# Patient Record
Sex: MALE | Race: WHITE | Employment: UNEMPLOYED | URBAN - METROPOLITAN AREA
[De-identification: names, ages, dates, MRNs, and addresses within clinical notes are randomized per-mention and may not be internally consistent; named-entity substitution may affect disease eponyms.]

---

## 2023-02-08 RX ORDER — CYCLOBENZAPRINE HCL 10 MG
10 TABLET ORAL 3 TIMES DAILY PRN
COMMUNITY

## 2023-02-08 RX ORDER — DULOXETIN HYDROCHLORIDE 60 MG/1
60 CAPSULE, DELAYED RELEASE ORAL DAILY
COMMUNITY

## 2023-02-08 RX ORDER — GABAPENTIN 800 MG/1
800 TABLET ORAL 3 TIMES DAILY
COMMUNITY

## 2023-02-08 RX ORDER — HYDROCODONE BITARTRATE AND ACETAMINOPHEN 7.5; 325 MG/1; MG/1
1 TABLET ORAL EVERY 6 HOURS PRN
COMMUNITY

## 2023-02-08 RX ORDER — ATORVASTATIN CALCIUM 80 MG/1
80 TABLET, FILM COATED ORAL DAILY
COMMUNITY

## 2023-02-08 RX ORDER — LIDOCAINE AND PRILOCAINE 25; 25 MG/G; MG/G
CREAM TOPICAL PRN
COMMUNITY

## 2023-02-08 RX ORDER — BUPROPION HYDROCHLORIDE 300 MG/1
300 TABLET ORAL EVERY MORNING
COMMUNITY

## 2023-02-08 RX ORDER — METFORMIN HYDROCHLORIDE 500 MG/1
500 TABLET, FILM COATED, EXTENDED RELEASE ORAL
COMMUNITY

## 2023-02-08 RX ORDER — CETIRIZINE HYDROCHLORIDE 10 MG/1
10 TABLET ORAL DAILY
COMMUNITY

## 2023-02-08 RX ORDER — ALBUTEROL SULFATE 90 UG/1
2 AEROSOL, METERED RESPIRATORY (INHALATION) EVERY 6 HOURS PRN
COMMUNITY

## 2023-02-08 RX ORDER — SILDENAFIL 100 MG/1
100 TABLET, FILM COATED ORAL PRN
COMMUNITY

## 2023-02-08 RX ORDER — FLUTICASONE PROPIONATE 50 MCG
1 SPRAY, SUSPENSION (ML) NASAL DAILY
COMMUNITY

## 2023-02-21 ENCOUNTER — OFFICE VISIT (OUTPATIENT)
Dept: NEUROSURGERY | Age: 58
End: 2023-02-21
Payer: OTHER GOVERNMENT

## 2023-02-21 VITALS
DIASTOLIC BLOOD PRESSURE: 67 MMHG | HEART RATE: 89 BPM | HEIGHT: 66 IN | OXYGEN SATURATION: 96 % | TEMPERATURE: 97.7 F | BODY MASS INDEX: 37.45 KG/M2 | SYSTOLIC BLOOD PRESSURE: 113 MMHG | WEIGHT: 233 LBS

## 2023-02-21 DIAGNOSIS — T84.216A HARDWARE FAILURE OF ANTERIOR COLUMN OF SPINE (HCC): ICD-10-CM

## 2023-02-21 DIAGNOSIS — G95.20 CERVICAL CORD COMPRESSION WITH MYELOPATHY (HCC): Primary | ICD-10-CM

## 2023-02-21 DIAGNOSIS — M80.08XK: ICD-10-CM

## 2023-02-21 DIAGNOSIS — Z72.0 TOBACCO ABUSE: ICD-10-CM

## 2023-02-21 PROCEDURE — 99204 OFFICE O/P NEW MOD 45 MIN: CPT | Performed by: NEUROLOGICAL SURGERY

## 2023-02-21 NOTE — PROGRESS NOTES
History of Present Illness    Patient Words: 58 y.o.     This patient is a 58 y.o. male who presents today for evaluation of a several year history of progressive upper extremity numbness weakness and incoordination with fine motor control issues.  He is left-handed.  He has difficulty using the left hand to sign his name.  Previous cervical spine surgery 15 years ago.  History of tobacco abuse.  CT scanning of the cervical spine shows previous anterior cervical fusion with plate fractures at multiple levels and nonunion at C3-4 and C7-T1.  Spinal stenosis is present.    Past Medical History:   Diagnosis Date    Anxiety     Intermittent explosive disorder     Nicotine dependence, other tobacco product, uncomplicated     Pain     Unspecified mood (affective) disorder (HCC)         No Known Allergies     No family history on file.     Social History     Socioeconomic History    Marital status:      Spouse name: Not on file    Number of children: Not on file    Years of education: Not on file    Highest education level: Not on file   Occupational History    Not on file   Tobacco Use    Smoking status: Every Day     Types: Cigarettes    Smokeless tobacco: Never   Substance and Sexual Activity    Alcohol use: Not Currently    Drug use: Not Currently    Sexual activity: Not on file   Other Topics Concern    Not on file   Social History Narrative    Not on file     Social Determinants of Health     Financial Resource Strain: Not on file   Food Insecurity: Not on file   Transportation Needs: Not on file   Physical Activity: Not on file   Stress: Not on file   Social Connections: Not on file   Intimate Partner Violence: Not on file   Housing Stability: Not on file       Current Outpatient Medications   Medication Sig Dispense Refill    albuterol sulfate HFA (PROVENTIL;VENTOLIN;PROAIR) 108 (90 Base) MCG/ACT inhaler Inhale 2 puffs into the lungs every 6 hours as needed for Wheezing      atorvastatin (LIPITOR) 80 MG  tablet Take 80 mg by mouth daily      buPROPion (WELLBUTRIN XL) 300 MG extended release tablet Take 300 mg by mouth every morning      cetirizine (ZYRTEC) 10 MG tablet Take 10 mg by mouth daily      Cholecalciferol 50 MCG (2000 UT) CAPS Take by mouth      cyclobenzaprine (FLEXERIL) 10 MG tablet Take 10 mg by mouth 3 times daily as needed for Muscle spasms      diclofenac sodium (VOLTAREN) 1 % GEL Apply topically 2 times daily      DULoxetine (CYMBALTA) 60 MG extended release capsule Take 60 mg by mouth daily      fluticasone (FLONASE) 50 MCG/ACT nasal spray 1 spray by Each Nostril route daily      gabapentin (NEURONTIN) 800 MG tablet Take 800 mg by mouth 3 times daily. lidocaine-prilocaine (EMLA) 2.5-2.5 % cream Apply topically as needed for Pain Apply topically as needed. sildenafil (VIAGRA) 100 MG tablet Take 100 mg by mouth as needed for Erectile Dysfunction      sodium chloride (OCEAN) 0.65 % nasal spray 1 spray by Nasal route as needed for Congestion      nicotine polacrilex (NICORETTE) 2 MG gum Take 2 mg by mouth as needed for Smoking cessation      naloxone HCl 8 MG/0.1ML LIQD nasal spray 1 spray by Nasal route as needed      metFORMIN, MOD, (GLUMETZA) 500 MG extended release tablet Take 500 mg by mouth daily (with breakfast)      HYDROcodone-acetaminophen (NORCO) 7.5-325 MG per tablet Take 1 tablet by mouth every 6 hours as needed for Pain. No current facility-administered medications for this visit. 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     /67   Pulse 89   Temp 97.7 °F (36.5 °C) (Temporal)   Ht 5' 6\" (1.676 m)   Wt 233 lb (105.7 kg)   SpO2 96%   BMI 37.61 kg/m² Physical Exam  The physical exam findings are as follows:      Cranial Nerves:   Intact visual fields. Fundi are benign. MALKA, EOM's full, no nystagmus, no ptosis. Facial sensation is normal. Corneal reflexes are intact. Facial movement is symmetric. Hearing is normal bilaterally. Palate is midline with normal sternocleidomastoid and trapezius muscles are normal. Tongue is midline. Motor:  5/5 strength in upper and lower proximal and distal muscles. Except 4/5  bilaterally. Normal bulk and tone. No fasciculations. Reflexes:   Deep tendon reflexes are brisk throughout. Sensory:   Normal to touch, pinprick and vibration. Gait:  Normal gait. Tremor:   No tremor noted. Cerebellar:  No cerebellar signs present. Except stiffness in the hands and decreased rapid alternating motions. Assessment & Plan      ICD-10-CM    1. Cervical cord compression with myelopathy (HCC)  G95.20       2. Tobacco abuse  Z72.0       3. 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 Age-related osteoporosis with current pathological fracture of vertebra with nonunion  M80. 09ZJ       Cervical spine CT/myelography return visit after above. His tobacco abuse history and continued cigarette use place him at a very high risk for complications with additional surgery and he understands this. When asked if he could quit smoking he said no.     Benjamin Ramirez MD

## 2023-03-13 ENCOUNTER — HOSPITAL ENCOUNTER (OUTPATIENT)
Dept: INTERVENTIONAL RADIOLOGY/VASCULAR | Age: 58
Discharge: HOME | End: 2023-03-16
Payer: OTHER GOVERNMENT

## 2023-03-13 ENCOUNTER — HOSPITAL ENCOUNTER (OUTPATIENT)
Dept: CT IMAGING | Age: 58
Discharge: HOME | End: 2023-03-16
Payer: OTHER GOVERNMENT

## 2023-03-13 VITALS
TEMPERATURE: 97.8 F | SYSTOLIC BLOOD PRESSURE: 89 MMHG | HEART RATE: 88 BPM | OXYGEN SATURATION: 93 % | DIASTOLIC BLOOD PRESSURE: 57 MMHG

## 2023-03-13 DIAGNOSIS — T84.216A HARDWARE FAILURE OF ANTERIOR COLUMN OF SPINE (HCC): ICD-10-CM

## 2023-03-13 DIAGNOSIS — Z72.0 TOBACCO ABUSE: ICD-10-CM

## 2023-03-13 DIAGNOSIS — M80.08XK: ICD-10-CM

## 2023-03-13 DIAGNOSIS — G95.20 CERVICAL CORD COMPRESSION WITH MYELOPATHY (HCC): ICD-10-CM

## 2023-03-13 PROCEDURE — 72126 CT NECK SPINE W/DYE: CPT

## 2023-03-13 PROCEDURE — 2709999900 IR MYELOGRAM CERVICAL

## 2023-03-13 PROCEDURE — 6360000004 HC RX CONTRAST MEDICATION: Performed by: PHYSICIAN ASSISTANT

## 2023-03-13 RX ADMIN — IOPAMIDOL 10 ML: 612 INJECTION, SOLUTION INTRATHECAL at 08:07

## 2023-03-13 NOTE — DISCHARGE INSTRUCTIONS
If you have any questions about your procedure, please call the Interventional Radiology department at [phone]. After business hours (5pm) and weekends, call the answering service at [phone] and ask for the Radiologist on call to be paged. Si tiene Preguntas acerca del procedimiento, por favor llame al departamento de Radiología Intervencional al [phone]. Después de horas de oficina (5 pm) y los fines de [place], llamar al Christi Santana al [phone] y pregunte por el Radiologo de [facility].

## 2023-03-16 ENCOUNTER — PREP FOR PROCEDURE (OUTPATIENT)
Dept: NEUROSURGERY | Age: 58
End: 2023-03-16

## 2023-03-16 ENCOUNTER — TELEPHONE (OUTPATIENT)
Dept: NEUROSURGERY | Age: 58
End: 2023-03-16

## 2023-03-16 ENCOUNTER — OFFICE VISIT (OUTPATIENT)
Dept: NEUROSURGERY | Age: 58
End: 2023-03-16
Payer: OTHER GOVERNMENT

## 2023-03-16 VITALS
HEART RATE: 103 BPM | BODY MASS INDEX: 36.96 KG/M2 | WEIGHT: 230 LBS | TEMPERATURE: 97.3 F | SYSTOLIC BLOOD PRESSURE: 153 MMHG | HEIGHT: 66 IN | DIASTOLIC BLOOD PRESSURE: 99 MMHG | OXYGEN SATURATION: 95 %

## 2023-03-16 DIAGNOSIS — T84.216A HARDWARE FAILURE OF ANTERIOR COLUMN OF SPINE (HCC): ICD-10-CM

## 2023-03-16 DIAGNOSIS — G95.20 CERVICAL CORD COMPRESSION WITH MYELOPATHY (HCC): Primary | ICD-10-CM

## 2023-03-16 DIAGNOSIS — Z72.0 TOBACCO ABUSE: ICD-10-CM

## 2023-03-16 DIAGNOSIS — M80.08XK: ICD-10-CM

## 2023-03-16 PROCEDURE — 99214 OFFICE O/P EST MOD 30 MIN: CPT | Performed by: NEUROLOGICAL SURGERY

## 2023-03-16 RX ORDER — SODIUM CHLORIDE 0.9 % (FLUSH) 0.9 %
5-40 SYRINGE (ML) INJECTION EVERY 12 HOURS SCHEDULED
OUTPATIENT
Start: 2023-03-16

## 2023-03-16 RX ORDER — SODIUM CHLORIDE 9 MG/ML
INJECTION, SOLUTION INTRAVENOUS PRN
OUTPATIENT
Start: 2023-03-16

## 2023-03-16 RX ORDER — SODIUM CHLORIDE 0.9 % (FLUSH) 0.9 %
5-40 SYRINGE (ML) INJECTION PRN
OUTPATIENT
Start: 2023-03-16

## 2023-03-16 NOTE — PROGRESS NOTES
History of Present Illness    Patient Words: 62 y.o. This patient is a 62 y.o. male who presents today for evaluation of a multiyear history of cervical myelopathy status post multiple anterior cervical spine surgeries with hardware failure evidenced by recent imaging. He presented with persistent weakness in the C7 distribution bilaterally despite conservative measures. Cervical spine MRI scanning was limited by artifact. CT/myelography of the cervical spine shows previous fusion anteriorly to C6 with spinal stenosis at C6-7 with cord compression best appreciated on the axial images. One of the anterior screws has migrated into the soft tissue space anteriorly at C3-4. Significant history of tobacco abuse. Past Medical History:   Diagnosis Date    Anxiety     Intermittent explosive disorder     Nicotine dependence, other tobacco product, uncomplicated     Pain     Unspecified mood (affective) disorder (HCC)         No Known Allergies     No family history on file.      Social History     Socioeconomic History    Marital status:      Spouse name: Not on file    Number of children: Not on file    Years of education: Not on file    Highest education level: Not on file   Occupational History    Not on file   Tobacco Use    Smoking status: Every Day     Types: Cigarettes    Smokeless tobacco: Never   Substance and Sexual Activity    Alcohol use: Not Currently    Drug use: Not Currently    Sexual activity: Not on file   Other Topics Concern    Not on file   Social History Narrative    Not on file     Social Determinants of Health     Financial Resource Strain: Not on file   Food Insecurity: Not on file   Transportation Needs: Not on file   Physical Activity: Not on file   Stress: Not on file   Social Connections: Not on file   Intimate Partner Violence: Not on file   Housing Stability: Not on file       Current Outpatient Medications   Medication Sig Dispense Refill    albuterol sulfate HFA (PROVENTIL;VENTOLIN;PROAIR) 108 (90 Base) MCG/ACT inhaler Inhale 2 puffs into the lungs every 6 hours as needed for Wheezing      atorvastatin (LIPITOR) 80 MG tablet Take 80 mg by mouth daily      buPROPion (WELLBUTRIN XL) 300 MG extended release tablet Take 300 mg by mouth every morning      cetirizine (ZYRTEC) 10 MG tablet Take 10 mg by mouth daily      Cholecalciferol 50 MCG (2000 UT) CAPS Take by mouth      cyclobenzaprine (FLEXERIL) 10 MG tablet Take 10 mg by mouth 3 times daily as needed for Muscle spasms      diclofenac sodium (VOLTAREN) 1 % GEL Apply topically 2 times daily      DULoxetine (CYMBALTA) 60 MG extended release capsule Take 60 mg by mouth daily      fluticasone (FLONASE) 50 MCG/ACT nasal spray 1 spray by Each Nostril route daily      gabapentin (NEURONTIN) 800 MG tablet Take 800 mg by mouth 3 times daily. lidocaine-prilocaine (EMLA) 2.5-2.5 % cream Apply topically as needed for Pain Apply topically as needed. sildenafil (VIAGRA) 100 MG tablet Take 100 mg by mouth as needed for Erectile Dysfunction      sodium chloride (OCEAN) 0.65 % nasal spray 1 spray by Nasal route as needed for Congestion      nicotine polacrilex (NICORETTE) 2 MG gum Take 2 mg by mouth as needed for Smoking cessation      naloxone HCl 8 MG/0.1ML LIQD nasal spray 1 spray by Nasal route as needed      metFORMIN, MOD, (GLUMETZA) 500 MG extended release tablet Take 500 mg by mouth daily (with breakfast)      HYDROcodone-acetaminophen (NORCO) 7.5-325 MG per tablet Take 1 tablet by mouth every 6 hours as needed for Pain. No current facility-administered medications for this visit.        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 BP (!) 153/99   Pulse (!) 103   Temp 97.3 °F (36.3 °C) (Temporal)   Ht 5' 6\" (1.676 m)   Wt 230 lb (104.3 kg)   SpO2 95%   BMI 37.12 kg/m²        Physical Exam  The physical exam findings are as follows:Physical Exam:   General:  Alert, cooperative, no distress, appears stated age. Eyes:  Conjunctivae/corneas clear. PERRL, EOMs intact. Fundi benign   Ears:  Normal TMs and external ear canals both ears. Nose: Nares normal. Septum midline. Mucosa normal. No drainage or sinus tenderness. Mouth/Throat: Lips, mucosa, and tongue normal. Teeth and gums normal.   Neck: Supple, symmetrical, trachea midline, no adenopathy, thyroid: no enlargment/tenderness/nodules, no carotid bruit and no JVD. Back:   Symmetric, no curvature. ROM normal. No CVA tenderness. Lungs:   Clear to auscultation bilaterally. Heart:  Regular rate and rhythm, S1, S2 normal, no murmur, click, rub or gallop. Abdomen:   Soft, non-tender. Bowel sounds normal. No masses,  No organomegaly. Extremities: Extremities normal, atraumatic, no cyanosis or edema. Pulses: 2+ and symmetric all extremities. Skin: Skin color, texture, turgor normal. No rashes or lesions   Lymph nodes: Cervical, supraclavicular, and axillary nodes normal.   Appearance: The patient is well developed, well nourished, provides a coherent history and is in no acute distress. Cranial Nerves:   Intact visual fields. Fundi are benign. MALKA, EOM's full, no nystagmus, no ptosis. Facial sensation is normal. Corneal reflexes are intact. Facial movement is symmetric. Hearing is normal bilaterally. Palate is midline with normal sternocleidomastoid and trapezius muscles are normal. Tongue is midline. Motor:  5/5 strength in upper and lower proximal and distal muscles. Except 4/5  and EDC weakness bilaterally. Normal bulk and tone. No fasciculations.    Reflexes:   Deep tendon reflexes 2+/4 and symmetrical.   Sensory:   Normal to touch, pinprick and vibration. Gait:  Normal gait. Tremor:   No tremor noted. Cerebellar:  No cerebellar signs present. Assessment & Plan      ICD-10-CM    1. Cervical cord compression with myelopathy (HCC)  G95.20       2. 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 Tobacco abuse  Z72.0       4. Age-related osteoporosis with current pathological fracture of vertebra with nonunion  M80. 08XK          Posterior cervical fusion C6-7. Bone growth stimulator postop due to tobacco abuse history. Conservative measures have failed. Cervical Fusion Procedures Consent: The relative risks of complication include but are not limited to infection, bleeding, spinal fluid leak, major vascular injury, increased pain, weakness, numbness, non fusion, instrumentation failure, need for re operation, postoperative hematoma that may require surgical evacuation, dysphonia, dysphagia, carotid, jugular, esophogeal and/or tracheal, paralysis, incontinence, impotence, heart attack, stroke and death were discussed with patient  And family members present. They have been provided the opportunity to ask any questions regarding the surgical procedure. The patient and family members present expressed understanding and agree to proceed with surgery    The risks are higher due to tobacco abuse and the patient understands this.     Dyllan Richardson MD

## 2023-04-03 ENCOUNTER — HOSPITAL ENCOUNTER (OUTPATIENT)
Dept: PREADMISSION TESTING | Age: 58
Discharge: HOME | End: 2023-04-06
Payer: OTHER GOVERNMENT

## 2023-04-03 ENCOUNTER — TELEPHONE (OUTPATIENT)
Dept: NEUROSURGERY | Age: 58
End: 2023-04-03

## 2023-04-03 VITALS
WEIGHT: 231.1 LBS | TEMPERATURE: 98.1 F | HEART RATE: 86 BPM | RESPIRATION RATE: 20 BRPM | OXYGEN SATURATION: 92 % | BODY MASS INDEX: 37.14 KG/M2 | HEIGHT: 66 IN | SYSTOLIC BLOOD PRESSURE: 150 MMHG | DIASTOLIC BLOOD PRESSURE: 91 MMHG

## 2023-04-03 LAB
ANION GAP SERPL CALC-SCNC: 3 MMOL/L (ref 2–11)
APPEARANCE UR: CLEAR
BACTERIA SPEC CULT: NORMAL
BASOPHILS # BLD: 0.1 K/UL (ref 0–0.2)
BASOPHILS NFR BLD: 1 % (ref 0–2)
BILIRUB UR QL: NEGATIVE
BUN SERPL-MCNC: 12 MG/DL (ref 6–23)
CALCIUM SERPL-MCNC: 12.9 MG/DL (ref 8.3–10.4)
CHLORIDE SERPL-SCNC: 109 MMOL/L (ref 101–110)
CO2 SERPL-SCNC: 28 MMOL/L (ref 21–32)
COLOR UR: NORMAL
CREAT SERPL-MCNC: 1.2 MG/DL (ref 0.8–1.5)
DIFFERENTIAL METHOD BLD: ABNORMAL
EOSINOPHIL # BLD: 0.6 K/UL (ref 0–0.8)
EOSINOPHIL NFR BLD: 6 % (ref 0.5–7.8)
ERYTHROCYTE [DISTWIDTH] IN BLOOD BY AUTOMATED COUNT: 14.9 % (ref 11.9–14.6)
EST. AVERAGE GLUCOSE BLD GHB EST-MCNC: 157 MG/DL
GLUCOSE SERPL-MCNC: 152 MG/DL (ref 65–100)
GLUCOSE UR STRIP.AUTO-MCNC: NEGATIVE MG/DL
HBA1C MFR BLD: 7.1 % (ref 4.8–5.6)
HCT VFR BLD AUTO: 49.6 % (ref 41.1–50.3)
HGB BLD-MCNC: 15.8 G/DL (ref 13.6–17.2)
HGB UR QL STRIP: NEGATIVE
IMM GRANULOCYTES # BLD AUTO: 0 K/UL (ref 0–0.5)
IMM GRANULOCYTES NFR BLD AUTO: 0 % (ref 0–5)
KETONES UR QL STRIP.AUTO: NEGATIVE MG/DL
LEUKOCYTE ESTERASE UR QL STRIP.AUTO: NEGATIVE
LYMPHOCYTES # BLD: 3.1 K/UL (ref 0.5–4.6)
LYMPHOCYTES NFR BLD: 29 % (ref 13–44)
MCH RBC QN AUTO: 28.1 PG (ref 26.1–32.9)
MCHC RBC AUTO-ENTMCNC: 31.9 G/DL (ref 31.4–35)
MCV RBC AUTO: 88.3 FL (ref 82–102)
MONOCYTES # BLD: 1.2 K/UL (ref 0.1–1.3)
MONOCYTES NFR BLD: 11 % (ref 4–12)
NEUTS SEG # BLD: 5.4 K/UL (ref 1.7–8.2)
NEUTS SEG NFR BLD: 52 % (ref 43–78)
NITRITE UR QL STRIP.AUTO: NEGATIVE
NRBC # BLD: 0 K/UL (ref 0–0.2)
PH UR STRIP: 7.5 (ref 5–9)
PLATELET # BLD AUTO: 312 K/UL (ref 150–450)
PMV BLD AUTO: 10.3 FL (ref 9.4–12.3)
POTASSIUM SERPL-SCNC: 4.2 MMOL/L (ref 3.5–5.1)
PROT UR STRIP-MCNC: NEGATIVE MG/DL
RBC # BLD AUTO: 5.62 M/UL (ref 4.23–5.6)
SERVICE CMNT-IMP: NORMAL
SODIUM SERPL-SCNC: 140 MMOL/L (ref 133–143)
SP GR UR REFRACTOMETRY: 1.01 (ref 1–1.02)
UROBILINOGEN UR QL STRIP.AUTO: 0.2 EU/DL (ref 0.2–1)
WBC # BLD AUTO: 10.4 K/UL (ref 4.3–11.1)

## 2023-04-03 PROCEDURE — 85025 COMPLETE CBC W/AUTO DIFF WBC: CPT

## 2023-04-03 PROCEDURE — 83036 HEMOGLOBIN GLYCOSYLATED A1C: CPT

## 2023-04-03 PROCEDURE — 81003 URINALYSIS AUTO W/O SCOPE: CPT

## 2023-04-03 PROCEDURE — 80048 BASIC METABOLIC PNL TOTAL CA: CPT

## 2023-04-03 PROCEDURE — 87641 MR-STAPH DNA AMP PROBE: CPT

## 2023-04-03 RX ORDER — ALBUTEROL SULFATE 1.25 MG/3ML
1 SOLUTION RESPIRATORY (INHALATION) EVERY 6 HOURS PRN
COMMUNITY

## 2023-04-03 ASSESSMENT — PAIN DESCRIPTION - DESCRIPTORS: DESCRIPTORS: DULL

## 2023-04-03 ASSESSMENT — PAIN DESCRIPTION - LOCATION: LOCATION: SHOULDER

## 2023-04-03 ASSESSMENT — PAIN SCALES - GENERAL: PAINLEVEL_OUTOF10: 5

## 2023-04-03 ASSESSMENT — PAIN - FUNCTIONAL ASSESSMENT: PAIN_FUNCTIONAL_ASSESSMENT: PREVENTS OR INTERFERES WITH ALL ACTIVE AND SOME PASSIVE ACTIVITIES

## 2023-04-03 ASSESSMENT — PAIN DESCRIPTION - ORIENTATION: ORIENTATION: RIGHT

## 2023-04-03 ASSESSMENT — PAIN DESCRIPTION - PAIN TYPE: TYPE: ACUTE PAIN

## 2023-04-03 ASSESSMENT — PAIN DESCRIPTION - DIRECTION: RADIATING_TOWARDS: FOREARM

## 2023-04-03 ASSESSMENT — PAIN DESCRIPTION - ONSET: ONSET: AWAKENED FROM SLEEP

## 2023-04-03 ASSESSMENT — PAIN DESCRIPTION - FREQUENCY: FREQUENCY: INTERMITTENT

## 2023-04-03 NOTE — PROGRESS NOTES
Notes       Component Ref Range & Units 4/3/23 1050   Sodium 133 - 143 mmol/L 140    Potassium 3.5 - 5.1 mmol/L 4.2    Chloride 101 - 110 mmol/L 109    CO2 21 - 32 mmol/L 28    Anion Gap 2 - 11 mmol/L 3    Glucose 65 - 100 mg/dL 152 High     BUN 6 - 23 MG/DL 12    Creatinine 0.8 - 1.5 MG/DL 1.20    Est, Glom Filt Rate >60 ml/min/1.73m2 >60    Comment:      Pediatric calculator link: Norma.at. org/professionals/kdoqi/gfr_calculatorped         These results are not intended for use in patients <25years of age. eGFR results are calculated without a race factor using  the 2021 CKD-EPI equation. Careful clinical correlation is recommended, particularly when comparing to results calculated using previous equations. The CKD-EPI equation is less accurate in patients with extremes of muscle mass, extra-renal metabolism of creatinine, excessive creatine ingestion, or following therapy that affects renal tubular secretion. Calcium 8.3 - 10.4 MG/DL       PLEASE REVIEW CA+= RESULTS FROM P.A.T.   ON 4/3/23- SPOKE TO DR WHITEHEAD-- REQUEST PT TO F/U WITH PCP AND HAVE MEDICAL CLEARENCE PRIOR TO SURG-- ANY  [address] CALL [phone]

## 2023-04-03 NOTE — PROGRESS NOTES
PLEASE CONTINUE TAKING ALL PRESCRIPTION MEDICATIONS UP TO THE DAY OF SURGERY UNLESS OTHERWISE DIRECTED BELOW. DISCONTINUE all vitamins and supplements 7 days prior to surgery. DISCONTINUE Non-Steriodal Anti-Inflammatory (NSAIDS) such as Advil and Aleve 5 days prior to surgery. Home Medications to take  the day of surgery    HYDROCODONE,  gabapentin,cymbalta, and if needed wellbutrin, flexeril   BRING ALBUTEROL INHALER        Home Medications   to Hold   ALL VITAMINS AND SUPPLEMENTSX 7 DAY , VIAGRA X 24 HRS, AND DICLOFENAC GEL X 7 DAYS        Comments      On the day before surgery please take Acetaminophen 1000mg in the morning and then again before bed. You may substitute for Tylenol 650 mg. Please do not bring home medications with you on the day of surgery unless otherwise directed by your nurse. If you are instructed to bring home medications, please give them to your nurse as they will be administered by the nursing staff. If you have any questions, please call [facility] [phone] or [address] [place] [address] [phone]. A copy of this note was provided to the patient for reference.

## 2023-04-03 NOTE — TELEPHONE ENCOUNTER
Per LORI Mayen -discussed with anesthesia- Calcium is 12.9 -last year 10. 1- recommendations of anesthesia medical clearance prior to surgery  4- posterior fusion  Patient does not know results  Will route to Dr. Maury Rashid

## 2023-04-03 NOTE — PROGRESS NOTES
Reviewed todays P.A. T. LABS-- CA++ 12.9-- WAS 10.1 ON 6/2022 LABS-- SPOKE TO DR WHITEHEAD OVER THE PHONE--HE REQUESTED  THAT SURG BE HELD TIL PT IS CLEARED BY PCP.-- CALLED OFFICE TO SPEAK TO VIDAL-- SHE IS TO SPEAK TO DR DELGADO AND CALL PT AS NEEDED- NOTE PLACED FOR CHART  FOR F/U  ----- OF NOTE-- LAB DID RECHECK THIS LAB ON  2 DIFFERENT MACHINE TO MAKE SURE WAS CORRECT. RESULTS WERE THE SAME PER LAB.

## 2023-04-03 NOTE — PROGRESS NOTES
Patient verified name, , and surgery as listed in [facility]. Patient provided medical/health information and PTA medications to the best of their ability. TYPE  CASE: 3  Orders per surgeon: WAS received  Labs per surgeon: CBC WITH DIFF, BMP, URINE, HA1C, MRSA SWAB, --ALL COLLECTED TODAY AND SENT TO LAB. Labs per anesthesia protocol: TYPE AND SCREEN DOS-- ORDER PLACED IN CC  [id number] IN CARE EVERYWHERE AS NEEDED     MRSA/MSSA swab collected;AND SENT TO LAB    Patient provided with and instructed on education handouts including Guide to Surgery, blood transfusions, pain management, and hand hygiene for the family and community, and [facility] brochure. Road to Recovery Spine surgery patient guide given. Instructed on incentive spirometry with return demonstration. Patient viewed spine prehab video. Hibiclens and instructions given per hospital policy. Original medication prescription bottles WAS NOT visualized during patient appointment. Patient teach back successful and patient demonstrates knowledge of instruction.

## 2023-04-04 NOTE — TELEPHONE ENCOUNTER
Advised patient at 4:02 on high Calcium level and needs to follow up with his PCP- he states he is on calcium.  He [facility]CP and [facility]. Per Dr. William Cruz  proceed with surgery

## 2023-04-06 ENCOUNTER — TELEPHONE (OUTPATIENT)
Dept: NEUROSURGERY | Age: 58
End: 2023-04-06

## 2023-04-07 NOTE — PROGRESS NOTES
Dr. Kallie Pop reviewed patient follow up with surgeon's office and that no appt with [facility] has been made prior to surgery. New orders received to \"repeat Calcium today. \" Called and notified patient, he states he lives in [place] and is unable to come in today for repeat labs. Dr. Kallie Pop notified and he states to find out from surgeon if case is urgent.  Staff message sent to Dr. Shereen Victor - awaiting further orders

## 2023-04-07 NOTE — PROGRESS NOTES
Spoke with Dr. Ngueyn Passer regarding case, new orders received \"tell patient to stop taking Calcium and repeat BMP on DOS. \" Patient notified and verbalized understanding.

## 2023-04-27 ENCOUNTER — NURSE ONLY (OUTPATIENT)
Dept: NEUROSURGERY | Age: 58
End: 2023-04-27

## 2023-04-27 VITALS
TEMPERATURE: 97.2 F | SYSTOLIC BLOOD PRESSURE: 132 MMHG | BODY MASS INDEX: 36.66 KG/M2 | WEIGHT: 228.13 LBS | DIASTOLIC BLOOD PRESSURE: 101 MMHG | HEART RATE: 97 BPM | HEIGHT: 66 IN | OXYGEN SATURATION: 98 %

## 2023-04-27 DIAGNOSIS — G95.20 CERVICAL CORD COMPRESSION WITH MYELOPATHY (HCC): Primary | ICD-10-CM

## 2023-04-27 NOTE — PROGRESS NOTES
Subjective: Patient had a difficult time finding our office today ; his gait and balance have improved      Kalpana Sanchez is a 62 y.o. male who presents today for wound check. He has a surgical incision wound which is located on the posterior cervical site. Current symptoms: none  wound healing as expected. Interventions to date: maintain strict post op restrictions  Suture/Staple removal consent:verbal consent obtained and given by the patient. Risks of removal include bleeding,infection,re-opening and excessive scarring    Dr. Jeff Schulte    is the supervising physician in clinic today and approves of today's treatment plan. Objective:     BP (!) 132/101 (Site: Left Upper Arm)   Pulse 97   Temp 97.2 °F (36.2 °C) (Temporal)   Ht 5' 6\" (1.676 m)   Wt 228 lb 2 oz (103.5 kg)   SpO2 98%   BMI 36.82 kg/m²     Wound:   wound margins intact and healing well. No signs of infection. appears improved compared to last recheck  no exudate     Assessment:     Wound check. Interventions today visual inspection  cleansing with betadine solution. Sprayed with ethyl chloride for comfort- removed all staples and intact using a staple removal tool. Benzoin sprayed and steri strips were applied    Plan:     1. Discussed appropriate home care of this wound. Advised to keep hair off his incision due to risk of infection due to oils produced from his hair  2. Patient instructions were given. 3. Follow up: a written AVS appointment sheet given to patient.

## 2023-05-09 ENCOUNTER — OFFICE VISIT (OUTPATIENT)
Dept: NEUROSURGERY | Age: 58
End: 2023-05-09

## 2023-05-09 ENCOUNTER — HOSPITAL ENCOUNTER (OUTPATIENT)
Dept: GENERAL RADIOLOGY | Age: 58
Discharge: HOME | End: 2023-05-12
Payer: OTHER GOVERNMENT

## 2023-05-09 VITALS
DIASTOLIC BLOOD PRESSURE: 90 MMHG | SYSTOLIC BLOOD PRESSURE: 150 MMHG | WEIGHT: 227 LBS | BODY MASS INDEX: 36.48 KG/M2 | TEMPERATURE: 97.2 F | OXYGEN SATURATION: 96 % | HEIGHT: 66 IN | HEART RATE: 88 BPM

## 2023-05-09 DIAGNOSIS — M80.08XK: ICD-10-CM

## 2023-05-09 DIAGNOSIS — Z72.0 TOBACCO ABUSE: ICD-10-CM

## 2023-05-09 DIAGNOSIS — G95.20 CERVICAL CORD COMPRESSION WITH MYELOPATHY (HCC): ICD-10-CM

## 2023-05-09 DIAGNOSIS — T84.216A HARDWARE FAILURE OF ANTERIOR COLUMN OF SPINE (HCC): ICD-10-CM

## 2023-05-09 DIAGNOSIS — G95.20 CERVICAL CORD COMPRESSION WITH MYELOPATHY (HCC): Primary | ICD-10-CM

## 2023-05-09 PROCEDURE — 72040 X-RAY EXAM NECK SPINE 2-3 VW: CPT

## 2023-05-09 NOTE — PROGRESS NOTES
History of Present Illness    Patient Words: 62 y.o. This patient is a 62 y.o. male who presents today for postsurgical follow-up status post posterior cervical fusion C 6 through 7. Overall patient is doing well. He has a minor discomfort in the shoulder occasionally in the neck. Cervical spine x-rays show stable appearance of his fusion unchanged in appearance from intraoperative. Past Medical History:   Diagnosis Date    Anxiety     COPD (chronic obstructive pulmonary disease) ([facility])     PRN INHALER    Diabetes mellitus ([address]     type 2-- does not check sqbs at home    Hyperlipidemia     Intermittent explosive disorder     Nicotine dependence, other tobacco product, uncomplicated     Pain     generalized    Sleep apnea     does not wear cpap at hs    Unspecified mood (affective) disorder ([facility])         Allergies   Allergen Reactions    Lipitor [Atorvastatin] Other (See Comments)     Pt reports becoming combative, violent adverse reaction        Family History   Problem Relation Age of Onset    Heart Disease Mother     Cancer Father     Diabetes Father         Social History     Socioeconomic History    Marital status:       Spouse name: Not on file    Number of children: Not on file    Years of education: Not on file    Highest education level: Not on file   Occupational History    Not on file   Tobacco Use    Smoking status: Every Day     Packs/day: 0.50     Years: 42.00     Pack years: 21.00     Types: Cigarettes    Smokeless tobacco: Former     Quit date: 1982   Vaping Use    Vaping Use: Former   Substance and Sexual Activity    Alcohol use: Not Currently    Drug use: Not Currently    Sexual activity: Not on file   Other Topics Concern    Not on file   Social History Narrative    Not on file     Social Determinants of Health     Financial Resource Strain: Not on file   Food Insecurity: Not on file   Transportation Needs: Not on file   Physical Activity: Not on file   Stress: Not on file   Social

## 2023-06-29 ENCOUNTER — OFFICE VISIT (OUTPATIENT)
Dept: NEUROSURGERY | Age: 58
End: 2023-06-29

## 2023-06-29 VITALS
WEIGHT: 228 LBS | DIASTOLIC BLOOD PRESSURE: 92 MMHG | HEART RATE: 83 BPM | SYSTOLIC BLOOD PRESSURE: 145 MMHG | BODY MASS INDEX: 36.8 KG/M2 | TEMPERATURE: 98.2 F | OXYGEN SATURATION: 97 %

## 2023-06-29 DIAGNOSIS — G95.20 CERVICAL CORD COMPRESSION WITH MYELOPATHY (HCC): ICD-10-CM

## 2023-06-29 DIAGNOSIS — Z72.0 TOBACCO ABUSE: ICD-10-CM

## 2023-06-29 DIAGNOSIS — T84.216A HARDWARE FAILURE OF ANTERIOR COLUMN OF SPINE (HCC): Primary | ICD-10-CM

## 2023-06-29 PROCEDURE — 99024 POSTOP FOLLOW-UP VISIT: CPT | Performed by: NEUROLOGICAL SURGERY

## 2023-10-02 ENCOUNTER — HOSPITAL ENCOUNTER (OUTPATIENT)
Dept: GENERAL RADIOLOGY | Age: 58
Discharge: HOME | End: 2023-10-05
Payer: OTHER GOVERNMENT

## 2023-10-02 ENCOUNTER — OFFICE VISIT (OUTPATIENT)
Dept: NEUROSURGERY | Age: 58
End: 2023-10-02
Payer: OTHER GOVERNMENT

## 2023-10-02 VITALS
SYSTOLIC BLOOD PRESSURE: 124 MMHG | TEMPERATURE: 97.5 F | WEIGHT: 226 LBS | BODY MASS INDEX: 36.32 KG/M2 | HEIGHT: 66 IN | OXYGEN SATURATION: 96 % | HEART RATE: 82 BPM | DIASTOLIC BLOOD PRESSURE: 86 MMHG

## 2023-10-02 DIAGNOSIS — G95.20 CERVICAL CORD COMPRESSION WITH MYELOPATHY (HCC): ICD-10-CM

## 2023-10-02 DIAGNOSIS — T84.216A HARDWARE FAILURE OF ANTERIOR COLUMN OF SPINE (HCC): ICD-10-CM

## 2023-10-02 DIAGNOSIS — M80.08XK: ICD-10-CM

## 2023-10-02 DIAGNOSIS — G95.20 CERVICAL CORD COMPRESSION WITH MYELOPATHY (HCC): Primary | ICD-10-CM

## 2023-10-02 DIAGNOSIS — Z72.0 TOBACCO ABUSE: ICD-10-CM

## 2023-10-02 PROCEDURE — 99213 OFFICE O/P EST LOW 20 MIN: CPT | Performed by: NEUROLOGICAL SURGERY

## 2023-10-02 PROCEDURE — 72040 X-RAY EXAM NECK SPINE 2-3 VW: CPT

## 2023-10-02 NOTE — PROGRESS NOTES
History of Present Illness    Patient Words: 62 y.o. This patient is a 62 y.o. male who presents today for postsurgical evaluation status post posterior cervical fusion C6-7. Overall patient is doing very well. He denies any extremity numbness tingling or weakness. His preoperative symptoms have for the most part resolved. He has had some personal tragedies in his life especially recently his younger 77-year-old sister  of a pulmonary thromboembolism. Past Medical History:   Diagnosis Date    Anxiety     COPD (chronic obstructive pulmonary disease) ([facility])     PRN INHALER    Diabetes mellitus ([address])     type 2-- does not check sqbs at home    Hyperlipidemia     Intermittent explosive disorder     Nicotine dependence, other tobacco product, uncomplicated     Pain     generalized    Sleep apnea     does not wear cpap at hs    Unspecified mood (affective) disorder ([facility])         Allergies   Allergen Reactions    Lipitor [Atorvastatin] Other (See Comments)     Pt reports becoming combative, violent adverse reaction        Family History   Problem Relation Age of Onset    Heart Disease Mother     Cancer Father     Diabetes Father         Social History     Socioeconomic History    Marital status:       Spouse name: Not on file    Number of children: Not on file    Years of education: Not on file    Highest education level: Not on file   Occupational History    Not on file   Tobacco Use    Smoking status: Every Day     Packs/day: 0.50     Years: 42.00     Additional pack years: 0.00     Total pack years: 21.00     Types: Cigarettes    Smokeless tobacco: Former     Quit date:    Vaping Use    Vaping Use: Former   Substance and Sexual Activity    Alcohol use: Not Currently    Drug use: Not Currently    Sexual activity: Not on file   Other Topics Concern    Not on file   Social History Narrative    Not on file     Social Determinants of Health     Financial Resource Strain: Not on file   Food Insecurity:

## 2023-11-27 ENCOUNTER — CLINICAL DOCUMENTATION (OUTPATIENT)
Dept: NEUROSURGERY | Age: 58
End: 2023-11-27

## 2023-11-27 NOTE — PROGRESS NOTES
Patient called office asking to make an appt with Dr. Patricia Gambino stated he had surgery this past Feb by Dr. Patricia Gambino. Also states that during that time he and Dr. Patricia Gambino discussed other procedures that needed to be performed after his Cervical was healed. I asked patient if it was for a different issue he stated yes, I then explained Dr. Patricia Gambino does our work ups now and if you'd like I could make an appt for you to come in a get a work up. Explained the process. .. Pt then says he doesn't want to go to 12 different doctors when he knows he needs sx -I told him he could f/u with his PCP that is in [place] where he lives to get updated testing . ... he could not understand why I could just set him up I'm guessing for surgery. Patient needs to f/u with his pcp then once updated testing is completed and it is a surgical situation he can come here and follow up with Dr. Birda Boeck or Dr. Leta Bowers as Dr. Patricia Gambino no longer does surgeries.